# Patient Record
Sex: FEMALE | Race: WHITE | HISPANIC OR LATINO | URBAN - METROPOLITAN AREA
[De-identification: names, ages, dates, MRNs, and addresses within clinical notes are randomized per-mention and may not be internally consistent; named-entity substitution may affect disease eponyms.]

---

## 2019-06-05 ENCOUNTER — OFFICE VISIT (OUTPATIENT)
Dept: MEDICAL GROUP | Facility: PHYSICIAN GROUP | Age: 29
End: 2019-06-05
Payer: COMMERCIAL

## 2019-06-05 VITALS
SYSTOLIC BLOOD PRESSURE: 120 MMHG | WEIGHT: 161.2 LBS | BODY MASS INDEX: 25.91 KG/M2 | TEMPERATURE: 97.9 F | HEART RATE: 91 BPM | RESPIRATION RATE: 16 BRPM | HEIGHT: 66 IN | OXYGEN SATURATION: 97 % | DIASTOLIC BLOOD PRESSURE: 70 MMHG

## 2019-06-05 DIAGNOSIS — Z30.41 ENCOUNTER FOR SURVEILLANCE OF CONTRACEPTIVE PILLS: ICD-10-CM

## 2019-06-05 DIAGNOSIS — Z83.3 FAMILY HISTORY OF DIABETES MELLITUS: ICD-10-CM

## 2019-06-05 DIAGNOSIS — Z00.00 WELLNESS EXAMINATION: Primary | ICD-10-CM

## 2019-06-05 DIAGNOSIS — J30.89 ENVIRONMENTAL AND SEASONAL ALLERGIES: ICD-10-CM

## 2019-06-05 PROCEDURE — 99385 PREV VISIT NEW AGE 18-39: CPT | Performed by: PHYSICIAN ASSISTANT

## 2019-06-05 RX ORDER — NORETHINDRONE ACETATE AND ETHINYL ESTRADIOL AND FERROUS FUMARATE 1MG-20(21)
1 KIT ORAL DAILY
COMMUNITY
Start: 2019-03-25 | End: 2019-09-20 | Stop reason: SDUPTHER

## 2019-06-05 RX ORDER — NORETHINDRONE ACETATE AND ETHINYL ESTRADIOL .03; 1.5 MG/1; MG/1
TABLET ORAL
COMMUNITY
End: 2019-06-05 | Stop reason: CLARIF

## 2019-06-05 ASSESSMENT — PATIENT HEALTH QUESTIONNAIRE - PHQ9
5. POOR APPETITE OR OVEREATING: 0 - NOT AT ALL
SUM OF ALL RESPONSES TO PHQ QUESTIONS 1-9: 1
CLINICAL INTERPRETATION OF PHQ2 SCORE: 1

## 2019-06-14 ENCOUNTER — HOSPITAL ENCOUNTER (OUTPATIENT)
Dept: LAB | Facility: MEDICAL CENTER | Age: 29
End: 2019-06-14
Attending: PHYSICIAN ASSISTANT
Payer: COMMERCIAL

## 2019-06-14 DIAGNOSIS — Z83.3 FAMILY HISTORY OF DIABETES MELLITUS: ICD-10-CM

## 2019-06-14 DIAGNOSIS — Z00.00 WELLNESS EXAMINATION: ICD-10-CM

## 2019-06-14 LAB
ALBUMIN SERPL BCP-MCNC: 4 G/DL (ref 3.2–4.9)
ALBUMIN/GLOB SERPL: 1.3 G/DL
ALP SERPL-CCNC: 40 U/L (ref 30–99)
ALT SERPL-CCNC: 13 U/L (ref 2–50)
ANION GAP SERPL CALC-SCNC: 11 MMOL/L (ref 0–11.9)
AST SERPL-CCNC: 18 U/L (ref 12–45)
BILIRUB SERPL-MCNC: 0.4 MG/DL (ref 0.1–1.5)
BUN SERPL-MCNC: 9 MG/DL (ref 8–22)
CALCIUM SERPL-MCNC: 8.9 MG/DL (ref 8.5–10.5)
CHLORIDE SERPL-SCNC: 106 MMOL/L (ref 96–112)
CHOLEST SERPL-MCNC: 153 MG/DL (ref 100–199)
CO2 SERPL-SCNC: 22 MMOL/L (ref 20–33)
CREAT SERPL-MCNC: 0.82 MG/DL (ref 0.5–1.4)
FASTING STATUS PATIENT QL REPORTED: NORMAL
GLOBULIN SER CALC-MCNC: 3.2 G/DL (ref 1.9–3.5)
GLUCOSE SERPL-MCNC: 94 MG/DL (ref 65–99)
HDLC SERPL-MCNC: 52 MG/DL
LDLC SERPL CALC-MCNC: 82 MG/DL
POTASSIUM SERPL-SCNC: 3.9 MMOL/L (ref 3.6–5.5)
PROT SERPL-MCNC: 7.2 G/DL (ref 6–8.2)
SODIUM SERPL-SCNC: 139 MMOL/L (ref 135–145)
TRIGL SERPL-MCNC: 96 MG/DL (ref 0–149)

## 2019-06-14 PROCEDURE — 80053 COMPREHEN METABOLIC PANEL: CPT

## 2019-06-14 PROCEDURE — 80061 LIPID PANEL: CPT

## 2019-06-14 PROCEDURE — 36415 COLL VENOUS BLD VENIPUNCTURE: CPT

## 2019-06-21 NOTE — PROGRESS NOTES
Chief Complaint   Patient presents with   • Establish Care     est nuria holliday-  no prior provider        HISTORY OF THE PRESENT ILLNESS: Kirstin Kam is a 29 y.o. female new patient to our practice. This pleasant patient is here today to establish care and to discuss the evaluation and management of:    Patient is a pleasant 29-year-old female here today for annual wellness examination.  She tells me she is in overall good health and has no complaints.  States she has a positive medical history for environmental and seasonal allergy and takes Claritin.  States symptoms are managed with medication.  She also tells me that her oral contraceptive Junel for contraceptive reasons.  States she takes birth control continuously.  States she is compliant with medication.  She tells me that she has been on oral contraceptive since 19 years old.  States her last menstrual period was in zero 3/18 and was 4-5 days in duration.  She tells me menses was heavier during that time.  Patient is  and states they are in a relationship. Denies being pregnant.  States she has a regular exercise routine exercise routine consist of yoga, walking, and running.  States on average she exercises 2-4 times a week anywhere from 30-60 minutes per time.  She mentions that her diet is healthy and she is cautious of sugar consumption.    States last Pap was in [id number] at [place].  Results were normal.  Denies having abnormal Pap in the past.    She tells me she had chickenpox as a child.  States her Tdap is up-to-date.    She mentions that she is a family history of diabetes.  Lab work will be ordered for further evaluation.  Patient is a symptomatic.    She denies doing self breast exams.  States she takes over-the-counter multivitamin.  Admits to practicing seatbelt safety.  States she could be better at applying sunscreen and wear protective clothing when outdoors.  She tells me that she practices good sleep hygiene on average sleeps  8-7 hours per night.  Admits to falling asleep easily and staying asleep.  Admits to getting regular eye and dental exams.      History reviewed. No pertinent past medical history.    Past Surgical History:   Procedure Laterality Date   • DENTAL EXTRACTION(S)         Family Status   Relation Status   • Mo Alive   • Fa Alive   • Sis Alive   • MGMo    • MGFa    • PGMo    • PGFa    • Sis Alive     Family History   Problem Relation Age of Onset   • Diabetes Mother         Type II    • Hypertension Mother    • Diabetes Father         Type II    • No Known Problems Sister    • Cancer Paternal Grandfather         Stomach CA   • No Known Problems Sister        Social History   Substance Use Topics   • Smoking status: Never Smoker   • Smokeless tobacco: Never Used   • Alcohol use 1.8 oz/week     3 Glasses of wine per week      Comment: 2-4 per week        Allergies: Patient has no known allergies.    Current Outpatient Prescriptions Ordered in [facility]   Medication Sig Dispense Refill   • JUNEL FE 1/20 1-20 MG-MCG per tablet Take 1 Tab by mouth every day.       No current [facility]-ordered facility-administered medications on file.        Review of Systems   Constitutional: Negative for fever, chills, weight loss and malaise/fatigue.   HENT: Negative for ear pain, nosebleeds, congestion, sore throat and neck pain.    Eyes: Negative for blurred vision.   Respiratory: Negative for cough, sputum production, shortness of breath and wheezing.    Cardiovascular: Negative for chest pain, palpitations, orthopnea and leg swelling.   Gastrointestinal: Negative for heartburn, nausea, vomiting and abdominal pain.   Genitourinary: Negative for dysuria, urgency and frequency.   Musculoskeletal: Negative for myalgias, back pain and joint pain.   Skin: Negative for rash and itching.   Neurological: Negative for dizziness, tingling, tremors, sensory change, focal weakness and headaches.   Endo/Heme/Allergies: Does not  "bruise/bleed easily.   Psychiatric/Behavioral: Negative for depression, anxiety, or memory loss.     All other systems reviewed and are negative except as in HPI.    Exam: /70 (BP Location: Left arm, Patient Position: Sitting, BP Cuff Size: Adult)   Pulse 91   Temp 36.6 °C (97.9 °F) (Temporal)   Resp 16   Ht 1.664 m (5' 5.5\")   Wt 73.1 kg (161 lb 3.2 oz)   SpO2 97%  Body mass index is 26.42 kg/m².  General: Normal appearing. No distress.  HEENT: Normocephalic. Eyes conjunctiva clear lids without ptosis, pupils equal and reactive to light accommodation, ears normal shape and contour, canals are clear bilaterally, tympanic membranes are benign, nasal mucosa benign, oropharynx is without erythema, edema or exudates.   Neck: Supple without JVD or bruit. Thyroid is not enlarged.  Pulmonary: Clear to ausculation.  Normal effort. No rales, ronchi, or wheezing.  Cardiovascular: Regular rate and rhythm without murmur.   Abdomen: Soft, nontender, nondistended. Normal bowel sounds. Liver and spleen are not palpable  Neurologic: Grossly nonfocal  Lymph: No cervical, supraclavicular or axillary lymph nodes are palpable  Skin: Warm and dry.  No obvious lesions.  Musculoskeletal: Normal gait. No extremity cyanosis, clubbing, or edema.  Psych: Normal mood and affect. Alert and oriented x3. Judgment and insight is normal.      Medical decision-making and discussion:  1. Wellness examination  - Encouraged diet high in fruits, vegetables, and fiber. And a diet low in salt, refined carbohydrates, cholesterol, saturated fat, and trans fatty acids.    - Encouraged  a minimum of 30 minutes of moderate intensity aerobic exercise (eg, brisk walking) is recommended on five days each week. Or 20 minutes of vigorous-intensity aerobic exercise (eg, jogging) on three days each week.     Continue getting regular eye dental exams.  Continue working on diet, exercise, sleep hygiene.  Continue work on hydration.  Discussed importance of " being compliant with birth control.  Continue multivitamin.  Advised patient to do self breast exams at least once monthly.  Continue practicing seatbelt safety.  Advised patient to wear sunscreen and protective clothing when outdoors.    - Lipid Profile; Future  - Comp Metabolic Panel; Future    2. Environmental and seasonal allergies  Advised nasal saline and steroid sprays daily. OTC anti-histamines (Zyrtec) as needed. Encouraged allergen avoidence and environment modification when possible. If regular use not effective, may consider referral to allergist for immunotherapy.      3. Encounter for surveillance of contraceptive pills  Discussed OCPs at length. Instructed on importance of taking pill at the same time every day. Use back-up for up to 2 weeks. Discussed risks associated with OCPs including: blood clot, heart attack, and stroke. Advised not to smoke while on hormonal birth control.      4. Family history of diabetes mellitus    - Lipid Profile; Future  - Comp Metabolic Panel; Future      Please note that this dictation was created using voice recognition software. I have made every reasonable attempt to correct obvious errors, but I expect that there are errors of grammar and possibly content that I did not discover before finalizing the note.      Assessment/Plan  1. Wellness examination  Lipid Profile    Comp Metabolic Panel   2. Environmental and seasonal allergies     3. Encounter for surveillance of contraceptive pills     4. Family history of diabetes mellitus  Lipid Profile    Comp Metabolic Panel       Return in about 1 year (around 6/5/2020), or if symptoms worsen or fail to improve.

## 2019-09-20 RX ORDER — NORETHINDRONE ACETATE AND ETHINYL ESTRADIOL AND FERROUS FUMARATE 1MG-20(21)
1 KIT ORAL DAILY
Qty: 84 TAB | Refills: 2 | Status: SHIPPED | OUTPATIENT
Start: 2019-09-20 | End: 2019-10-09 | Stop reason: SDUPTHER

## 2019-10-09 ENCOUNTER — TELEPHONE (OUTPATIENT)
Dept: MEDICAL GROUP | Facility: PHYSICIAN GROUP | Age: 29
End: 2019-10-09

## 2019-10-09 RX ORDER — NORETHINDRONE ACETATE AND ETHINYL ESTRADIOL AND FERROUS FUMARATE 1MG-20(21)
1 KIT ORAL DAILY
Qty: 84 TAB | Refills: 0 | Status: CANCELLED | OUTPATIENT
Start: 2019-10-09

## 2019-10-09 RX ORDER — NORETHINDRONE ACETATE AND ETHINYL ESTRADIOL AND FERROUS FUMARATE 1MG-20(21)
1 KIT ORAL DAILY
Qty: 84 TAB | Refills: 2 | Status: SHIPPED | OUTPATIENT
Start: 2019-10-09 | End: 2020-05-11

## 2019-10-09 NOTE — TELEPHONE ENCOUNTER
Phone Number Called: [phone] (home)       Call outcome: spoke to patient regarding message below    Message: Pt told me that she needs a refill on her birth control (Junel ). Pt asked if order can be faxed to [facility], Fax:[phone].

## 2019-10-09 NOTE — TELEPHONE ENCOUNTER
VOICEMAIL  1. Caller Name: Kirstin Kam    Call Back Number: [phone] (home)       2. Message: Pt lvm that she has a rx request that was sent that needs approval.     3. Patient approves office to leave a detailed voicemail/MyChart message: N\A

## 2020-05-12 RX ORDER — NORETHINDRONE ACETATE AND ETHINYL ESTRADIOL AND FERROUS FUMARATE 1MG-20(21)
KIT ORAL
Qty: 84 TAB | Refills: 0 | Status: SHIPPED | OUTPATIENT
Start: 2020-05-12 | End: 2020-07-27

## 2020-07-27 RX ORDER — NORETHINDRONE ACETATE AND ETHINYL ESTRADIOL AND FERROUS FUMARATE 1MG-20(21)
KIT ORAL
Qty: 84 TAB | Refills: 0 | Status: SHIPPED | OUTPATIENT
Start: 2020-07-27 | End: 2020-11-02

## 2020-09-28 ENCOUNTER — OFFICE VISIT (OUTPATIENT)
Dept: URGENT CARE | Facility: CLINIC | Age: 30
End: 2020-09-28
Payer: COMMERCIAL

## 2020-09-28 ENCOUNTER — NURSE TRIAGE (OUTPATIENT)
Dept: HEALTH INFORMATION MANAGEMENT | Facility: OTHER | Age: 30
End: 2020-09-28

## 2020-09-28 VITALS
WEIGHT: 166.4 LBS | DIASTOLIC BLOOD PRESSURE: 68 MMHG | SYSTOLIC BLOOD PRESSURE: 120 MMHG | HEART RATE: 92 BPM | BODY MASS INDEX: 28.41 KG/M2 | RESPIRATION RATE: 14 BRPM | TEMPERATURE: 98.6 F | OXYGEN SATURATION: 98 % | HEIGHT: 64 IN

## 2020-09-28 DIAGNOSIS — J02.8 ACUTE PHARYNGITIS DUE TO OTHER SPECIFIED ORGANISMS: ICD-10-CM

## 2020-09-28 DIAGNOSIS — J02.9 SORE THROAT: ICD-10-CM

## 2020-09-28 LAB
INT CON NEG: NORMAL
INT CON POS: NORMAL
S PYO AG THROAT QL: NORMAL

## 2020-09-28 PROCEDURE — 99213 OFFICE O/P EST LOW 20 MIN: CPT | Performed by: NURSE PRACTITIONER

## 2020-09-28 PROCEDURE — 87880 STREP A ASSAY W/OPTIC: CPT | Performed by: NURSE PRACTITIONER

## 2020-09-28 ASSESSMENT — ENCOUNTER SYMPTOMS
EYE DISCHARGE: 0
NAUSEA: 0
FEVER: 0
HEADACHES: 0
COUGH: 0
MYALGIAS: 0
CHILLS: 0
SORE THROAT: 1
DIARRHEA: 0

## 2020-09-28 NOTE — TELEPHONE ENCOUNTER
"  1. Caller Name: Kirstin Kam                   Call Back Number: [phone] (home)     [facility] PCP or Specialty Provider: Yes         2.  In the last two weeks, has the patient had any new or worsening symptoms (not explained by alternative diagnosis)? Yes, the patient reports the following COVID-19 consistent symptoms: sore throat and congestion or runny nose.    3.  Does patient have any comoribidities? None     4.  Has the patient traveled in the last 14 days OR had any known contact with someone who is suspected or confirmed to have COVID-19?  No.    5. Disposition: Advised to go to     Note routed to [facility] Provider: LINDA only.   Reason for Disposition  • SEVERE sore throat pain    Additional Information  • Negative: SEVERE difficulty breathing (e.g., struggling for each breath, speaks in single words)  • Negative: Sounds like a life-threatening emergency to the triager  • Negative: Throat culture results, call about  • Negative: Productive cough is the main symptom  • Negative: Runny nose is the main symptom  • Negative: Drooling or spitting out saliva (because can't swallow)  • Negative: Unable to open mouth completely  • Negative: Drinking very little and has signs of dehydration (e.g., no urine > 12 hours, very dry mouth, very lightheaded)  • Negative: Patient sounds very sick or weak to the triager  • Negative: Difficulty breathing (per caller) but not severe  • Negative: Fever > 103 F (39.4 C)  • Negative: Refuses to drink anything for > 12 hours    Answer Assessment - Initial Assessment Questions  1. ONSET: \"When did the throat start hurting?\" (Hours or days ago)       today  2. SEVERITY: \"How bad is the sore throat?\" (Scale 1-10; mild, moderate or severe)    - MILD (1-3):  doesn't interfere with eating or normal activities    - MODERATE (4-7): interferes with eating some solids and normal activities    - SEVERE (8-10):  excruciating pain, interferes with most normal activities    - SEVERE " "DYSPHAGIA: can't swallow liquids, drooling      8/10  3. STREP EXPOSURE: \"Has there been any exposure to strep within the past week?\" If so, ask: \"What type of contact occurred?\"       no  4.  VIRAL SYMPTOMS: \"Are there any symptoms of a cold, such as a runny nose, cough, hoarse voice or red eyes?\"       Runny nose  5. FEVER: \"Do you have a fever?\" If so, ask: \"What is your temperature, how was it measured, and when did it start?\"      none  6. PUS ON THE TONSILS: \"Is there pus on the tonsils in the back of your throat?\"      non3  7. OTHER SYMPTOMS: \"Do you have any other symptoms?\" (e.g., difficulty breathing, headache, rash)      none  8. PREGNANCY: \"Is there any chance you are pregnant?\" \"When was your last menstrual period?\"      No LMP 8/30    Protocols used: SORE THROAT-A-OH      "

## 2020-09-28 NOTE — PROGRESS NOTES
Subjective:      Kirstin Kam is a 30 y.o. female who presents with Pharyngitis (x1 days, sore throat, swollen tonsils )            HPI New. 30 year old female with sore throat that started this morning. She denies fever, chills, myalgia, ear pain or neck pain. She denies congestion or cough. No known exposure to Covid 19. She has not taken any medication for this issue.  Patient has no known allergies.  Current Outpatient Medications on File Prior to Visit   Medication Sig Dispense Refill   • JUNEL FE 1/20 1-20 MG-MCG per tablet TAKE 1 TABLET BY MOUTH  DAILY (Patient not taking: Reported on 9/28/2020) 84 Tab 0     No current facility-administered medications on file prior to visit.      Social History     Socioeconomic History   • Marital status: Single     Spouse name: Not on file   • Number of children: Not on file   • Years of education: Not on file   • Highest education level: Not on file   Occupational History   • Not on file   Social Needs   • Financial resource strain: Not on file   • Food insecurity     Worry: Not on file     Inability: Not on file   • Transportation needs     Medical: Not on file     Non-medical: Not on file   Tobacco Use   • Smoking status: Never Smoker   • Smokeless tobacco: Never Used   Substance and Sexual Activity   • Alcohol use: Yes     Alcohol/week: 1.8 oz     Types: 3 Glasses of wine per week     Comment: 2-4 per week    • Drug use: Yes     Types: Marijuana     Comment: 3-4 times per week.    • Sexual activity: Yes     Partners: Male     Birth control/protection: Pill     Comment: Committed relationship.    Lifestyle   • Physical activity     Days per week: Not on file     Minutes per session: Not on file   • Stress: Not on file   Relationships   • Social connections     Talks on phone: Not on file     Gets together: Not on file     Attends Yazidism service: Not on file     Active member of club or organization: Not on file     Attends meetings of clubs or organizations: Not on  "file     Relationship status: Not on file   • Intimate partner violence     Fear of current or ex partner: Not on file     Emotionally abused: Not on file     Physically abused: Not on file     Forced sexual activity: Not on file   Other Topics Concern   • Not on file   Social History Narrative   • Not on file     Breast Cancer-related family history is not on file.      Review of Systems   Constitutional: Negative for chills, fever and malaise/fatigue.   HENT: Positive for sore throat. Negative for congestion.    Eyes: Negative for discharge.   Respiratory: Negative for cough.    Gastrointestinal: Negative for diarrhea and nausea.   Musculoskeletal: Negative for myalgias.   Neurological: Negative for headaches.   Endo/Heme/Allergies: Negative for environmental allergies.          Objective:     /68   Pulse 92   Temp 37 °C (98.6 °F) (Temporal)   Resp 14   Ht 1.626 m (5' 4\")   Wt 75.5 kg (166 lb 6.4 oz)   SpO2 98%   BMI 28.56 kg/m²      Physical Exam  Vitals signs and nursing note reviewed.   Constitutional:       General: She is not in acute distress.     Appearance: Normal appearance. She is well-developed. She is not ill-appearing.   HENT:      Head: Normocephalic and atraumatic.      Right Ear: Tympanic membrane, ear canal and external ear normal. No middle ear effusion. Tympanic membrane is not injected or perforated.      Left Ear: Tympanic membrane, ear canal and external ear normal.  No middle ear effusion. Tympanic membrane is not injected or perforated.      Nose: Mucosal edema present. No congestion or rhinorrhea.      Mouth/Throat:      Mouth: Mucous membranes are moist.      Pharynx: Posterior oropharyngeal erythema present. No oropharyngeal exudate.   Eyes:      General:         Right eye: No discharge.         Left eye: No discharge.      Conjunctiva/sclera: Conjunctivae normal.   Neck:      Musculoskeletal: Normal range of motion and neck supple.   Cardiovascular:      Rate and Rhythm: " Normal rate and regular rhythm.      Heart sounds: Normal heart sounds. No murmur.   Pulmonary:      Effort: Pulmonary effort is normal. No respiratory distress.      Breath sounds: Normal breath sounds.   Musculoskeletal: Normal range of motion.      Comments: Normal movement of all 4 extremities.   Lymphadenopathy:      Cervical: No cervical adenopathy.      Upper Body:      Right upper body: No supraclavicular adenopathy.      Left upper body: No supraclavicular adenopathy.   Skin:     General: Skin is warm and dry.   Neurological:      Mental Status: She is alert and oriented to person, place, and time.      Gait: Gait normal.   Psychiatric:         Behavior: Behavior normal.         Thought Content: Thought content normal.                 Assessment/Plan:        1. Acute pharyngitis due to other specified organisms     2. Sore throat  POCT Rapid Strep A     Strep negative.  Viral illness at this time with no indication for antibiotics. Reviewed with patient expected course of illness and also reviewed OTC medications that may be used for symptom relief. Follow up 7-10 days if not improving.

## 2020-11-16 RX ORDER — NORETHINDRONE ACETATE AND ETHINYL ESTRADIOL AND FERROUS FUMARATE 1MG-20(21)
KIT ORAL
Qty: 28 TAB | Refills: 0 | Status: SHIPPED | OUTPATIENT
Start: 2020-11-16

## 2024-07-02 ENCOUNTER — APPOINTMENT (RX ONLY)
Dept: URBAN - METROPOLITAN AREA CLINIC 6 | Facility: CLINIC | Age: 34
Setting detail: DERMATOLOGY
End: 2024-07-02

## 2024-07-02 DIAGNOSIS — D22 MELANOCYTIC NEVI: ICD-10-CM

## 2024-07-02 DIAGNOSIS — L20.89 OTHER ATOPIC DERMATITIS: ICD-10-CM | Status: RESOLVED

## 2024-07-02 DIAGNOSIS — L70.0 ACNE VULGARIS: ICD-10-CM | Status: STABLE

## 2024-07-02 DIAGNOSIS — L81.4 OTHER MELANIN HYPERPIGMENTATION: ICD-10-CM

## 2024-07-02 DIAGNOSIS — Z71.89 OTHER SPECIFIED COUNSELING: ICD-10-CM

## 2024-07-02 DIAGNOSIS — D18.0 HEMANGIOMA: ICD-10-CM

## 2024-07-02 PROBLEM — D18.01 HEMANGIOMA OF SKIN AND SUBCUTANEOUS TISSUE: Status: ACTIVE | Noted: 2024-07-02

## 2024-07-02 PROBLEM — D22.5 MELANOCYTIC NEVI OF TRUNK: Status: ACTIVE | Noted: 2024-07-02

## 2024-07-02 PROCEDURE — ? COUNSELING

## 2024-07-02 PROCEDURE — ? DEFER

## 2024-07-02 PROCEDURE — 99203 OFFICE O/P NEW LOW 30 MIN: CPT

## 2024-07-02 PROCEDURE — ? SUNSCREEN TREATMENT REGIMEN

## 2024-07-02 PROCEDURE — ? ADDITIONAL NOTES

## 2024-07-02 ASSESSMENT — LOCATION SIMPLE DESCRIPTION DERM
LOCATION SIMPLE: LEFT POPLITEAL SKIN
LOCATION SIMPLE: CHEST
LOCATION SIMPLE: LEFT LIP
LOCATION SIMPLE: LEFT ELBOW
LOCATION SIMPLE: RIGHT HAND
LOCATION SIMPLE: RIGHT ELBOW
LOCATION SIMPLE: ABDOMEN
LOCATION SIMPLE: LEFT HAND
LOCATION SIMPLE: RIGHT POPLITEAL SKIN
LOCATION SIMPLE: LEFT FOREHEAD

## 2024-07-02 ASSESSMENT — LOCATION ZONE DERM
LOCATION ZONE: HAND
LOCATION ZONE: FACE
LOCATION ZONE: ARM
LOCATION ZONE: LEG
LOCATION ZONE: LIP
LOCATION ZONE: TRUNK

## 2024-07-02 ASSESSMENT — LOCATION DETAILED DESCRIPTION DERM
LOCATION DETAILED: LEFT POPLITEAL SKIN
LOCATION DETAILED: EPIGASTRIC SKIN
LOCATION DETAILED: PERIUMBILICAL SKIN
LOCATION DETAILED: LEFT RADIAL DORSAL HAND
LOCATION DETAILED: RIGHT RADIAL DORSAL HAND
LOCATION DETAILED: RIGHT ELBOW
LOCATION DETAILED: LEFT ELBOW
LOCATION DETAILED: LEFT LOWER CUTANEOUS LIP
LOCATION DETAILED: LEFT INFERIOR FOREHEAD
LOCATION DETAILED: RIGHT POPLITEAL SKIN
LOCATION DETAILED: MIDDLE STERNUM

## 2024-07-02 NOTE — PROCEDURE: ADDITIONAL NOTES
Additional Notes: Can prescribe Triamcinolone if flaring in the future\\nRecommend OTC Hydrocortisone Cream
Detail Level: Simple
Render Risk Assessment In Note?: no

## 2024-07-02 NOTE — PROCEDURE: DEFER
X Size Of Lesion In Cm (Optional): 0
Detail Level: Detailed
Introduction Text (Please End With A Colon): Declines treatment at this time

## 2024-07-02 NOTE — PROCEDURE: COUNSELING
Detail Level: Generalized
Detail Level: Zone
Detail Level: Simple
Use Enhanced Medication Counseling?: No
Aklief Pregnancy And Lactation Text: It is unknown if this medication is safe to use during pregnancy.  It is unknown if this medication is excreted in breast milk.  Breastfeeding women should use the topical cream on the smallest area of the skin for the shortest time needed while breastfeeding.  Do not apply to nipple and areola.
Sarecycline Counseling: Patient advised regarding possible photosensitivity and discoloration of the teeth, skin, lips, tongue and gums.  Patient instructed to avoid sunlight, if possible.  When exposed to sunlight, patients should wear protective clothing, sunglasses, and sunscreen.  The patient was instructed to call the office immediately if the following severe adverse effects occur:  hearing changes, easy bruising/bleeding, severe headache, or vision changes.  The patient verbalized understanding of the proper use and possible adverse effects of sarecycline.  All of the patient's questions and concerns were addressed.
Topical Clindamycin Counseling: Patient counseled that this medication may cause skin irritation or allergic reactions.  In the event of skin irritation, the patient was advised to reduce the amount of the drug applied or use it less frequently.   The patient verbalized understanding of the proper use and possible adverse effects of clindamycin.  All of the patient's questions and concerns were addressed.
Spironolactone Counseling: Patient advised regarding risks of diarrhea, abdominal pain, hyperkalemia, birth defects (for female patients), liver toxicity and renal toxicity. The patient may need blood work to monitor liver and kidney function and potassium levels while on therapy. The patient verbalized understanding of the proper use and possible adverse effects of spironolactone.  All of the patient's questions and concerns were addressed.
Azelaic Acid Pregnancy And Lactation Text: This medication is considered safe during pregnancy and breast feeding.
Dapsone Counseling: I discussed with the patient the risks of dapsone including but not limited to hemolytic anemia, agranulocytosis, rashes, methemoglobinemia, kidney failure, peripheral neuropathy, headaches, GI upset, and liver toxicity.  Patients who start dapsone require monitoring including baseline LFTs and weekly CBCs for the first month, then every month thereafter.  The patient verbalized understanding of the proper use and possible adverse effects of dapsone.  All of the patient's questions and concerns were addressed.
Isotretinoin Pregnancy And Lactation Text: This medication is Pregnancy Category X and is considered extremely dangerous during pregnancy. It is unknown if it is excreted in breast milk.
Tazorac Counseling:  Patient advised that medication is irritating and drying.  Patient may need to apply sparingly and wash off after an hour before eventually leaving it on overnight.  The patient verbalized understanding of the proper use and possible adverse effects of tazorac.  All of the patient's questions and concerns were addressed.
Winlevi Pregnancy And Lactation Text: This medication is considered safe during pregnancy and breastfeeding.
Topical Retinoid counseling:  Patient advised to apply a pea-sized amount only at bedtime and wait 30 minutes after washing their face before applying.  If too drying, patient may add a non-comedogenic moisturizer. The patient verbalized understanding of the proper use and possible adverse effects of retinoids.  All of the patient's questions and concerns were addressed.
Topical Sulfur Applications Pregnancy And Lactation Text: This medication is Pregnancy Category C and has an unknown safety profile during pregnancy. It is unknown if this topical medication is excreted in breast milk.
Birth Control Pills Counseling: Birth Control Pill Counseling: I discussed with the patient the potential side effects of OCPs including but not limited to increased risk of stroke, heart attack, thrombophlebitis, deep venous thrombosis, hepatic adenomas, breast changes, GI upset, headaches, and depression.  The patient verbalized understanding of the proper use and possible adverse effects of OCPs. All of the patient's questions and concerns were addressed.
Erythromycin Pregnancy And Lactation Text: This medication is Pregnancy Category B and is considered safe during pregnancy. It is also excreted in breast milk.
Doxycycline Pregnancy And Lactation Text: This medication is Pregnancy Category D and not consider safe during pregnancy. It is also excreted in breast milk but is considered safe for shorter treatment courses.
Minocycline Counseling: Patient advised regarding possible photosensitivity and discoloration of the teeth, skin, lips, tongue and gums.  Patient instructed to avoid sunlight, if possible.  When exposed to sunlight, patients should wear protective clothing, sunglasses, and sunscreen.  The patient was instructed to call the office immediately if the following severe adverse effects occur:  hearing changes, easy bruising/bleeding, severe headache, or vision changes.  The patient verbalized understanding of the proper use and possible adverse effects of minocycline.  All of the patient's questions and concerns were addressed.
Bactrim Counseling:  I discussed with the patient the risks of sulfa antibiotics including but not limited to GI upset, allergic reaction, drug rash, diarrhea, dizziness, photosensitivity, and yeast infections.  Rarely, more serious reactions can occur including but not limited to aplastic anemia, agranulocytosis, methemoglobinemia, blood dyscrasias, liver or kidney failure, lung infiltrates or desquamative/blistering drug rashes.
Tetracycline Pregnancy And Lactation Text: This medication is Pregnancy Category D and not consider safe during pregnancy. It is also excreted in breast milk.
Azelaic Acid Counseling: Patient counseled that medicine may cause skin irritation and to avoid applying near the eyes.  In the event of skin irritation, the patient was advised to reduce the amount of the drug applied or use it less frequently.   The patient verbalized understanding of the proper use and possible adverse effects of azelaic acid.  All of the patient's questions and concerns were addressed.
Birth Control Pills Pregnancy And Lactation Text: This medication should be avoided if pregnant and for the first 30 days post-partum.
Isotretinoin Counseling: Patient should get monthly blood tests, not donate blood, not drive at night if vision affected, not share medication, and not undergo elective surgery for 6 months after tx completed. Side effects reviewed, pt to contact office should one occur.
Benzoyl Peroxide Counseling: Patient counseled that medicine may cause skin irritation and bleach clothing.  In the event of skin irritation, the patient was advised to reduce the amount of the drug applied or use it less frequently.   The patient verbalized understanding of the proper use and possible adverse effects of benzoyl peroxide.  All of the patient's questions and concerns were addressed.
Topical Clindamycin Pregnancy And Lactation Text: This medication is Pregnancy Category B and is considered safe during pregnancy. It is unknown if it is excreted in breast milk.
Winlevi Counseling:  I discussed with the patient the risks of topical clascoterone including but not limited to erythema, scaling, itching, and stinging. Patient voiced their understanding.
Azithromycin Counseling:  I discussed with the patient the risks of azithromycin including but not limited to GI upset, allergic reaction, drug rash, diarrhea, and yeast infections.
Spironolactone Pregnancy And Lactation Text: This medication can cause feminization of the male fetus and should be avoided during pregnancy. The active metabolite is also found in breast milk.
Dapsone Pregnancy And Lactation Text: This medication is Pregnancy Category C and is not considered safe during pregnancy or breast feeding.
High Dose Vitamin A Counseling: Side effects reviewed, pt to contact office should one occur.
Tazorac Pregnancy And Lactation Text: This medication is not safe during pregnancy. It is unknown if this medication is excreted in breast milk.
Doxycycline Counseling:  Patient counseled regarding possible photosensitivity and increased risk for sunburn.  Patient instructed to avoid sunlight, if possible.  When exposed to sunlight, patients should wear protective clothing, sunglasses, and sunscreen.  The patient was instructed to call the office immediately if the following severe adverse effects occur:  hearing changes, easy bruising/bleeding, severe headache, or vision changes.  The patient verbalized understanding of the proper use and possible adverse effects of doxycycline.  All of the patient's questions and concerns were addressed.
High Dose Vitamin A Pregnancy And Lactation Text: High dose vitamin A therapy is contraindicated during pregnancy and breast feeding.
Tetracycline Counseling: Patient counseled regarding possible photosensitivity and increased risk for sunburn.  Patient instructed to avoid sunlight, if possible.  When exposed to sunlight, patients should wear protective clothing, sunglasses, and sunscreen.  The patient was instructed to call the office immediately if the following severe adverse effects occur:  hearing changes, easy bruising/bleeding, severe headache, or vision changes.  The patient verbalized understanding of the proper use and possible adverse effects of tetracycline.  All of the patient's questions and concerns were addressed. Patient understands to avoid pregnancy while on therapy due to potential birth defects.
Azithromycin Pregnancy And Lactation Text: This medication is considered safe during pregnancy and is also secreted in breast milk.
Topical Retinoid Pregnancy And Lactation Text: This medication is Pregnancy Category C. It is unknown if this medication is excreted in breast milk.
Erythromycin Counseling:  I discussed with the patient the risks of erythromycin including but not limited to GI upset, allergic reaction, drug rash, diarrhea, increase in liver enzymes, and yeast infections.
Aklief counseling:  Patient advised to apply a pea-sized amount only at bedtime and wait 30 minutes after washing their face before applying.  If too drying, patient may add a non-comedogenic moisturizer.  The most commonly reported side effects including irritation, redness, scaling, dryness, stinging, burning, itching, and increased risk of sunburn.  The patient verbalized understanding of the proper use and possible adverse effects of retinoids.  All of the patient's questions and concerns were addressed.
Bactrim Pregnancy And Lactation Text: This medication is Pregnancy Category D and is known to cause fetal risk.  It is also excreted in breast milk.
Benzoyl Peroxide Pregnancy And Lactation Text: This medication is Pregnancy Category C. It is unknown if benzoyl peroxide is excreted in breast milk.
Topical Sulfur Applications Counseling: Topical Sulfur Counseling: Patient counseled that this medication may cause skin irritation or allergic reactions.  In the event of skin irritation, the patient was advised to reduce the amount of the drug applied or use it less frequently.   The patient verbalized understanding of the proper use and possible adverse effects of topical sulfur application.  All of the patient's questions and concerns were addressed.

## 2024-09-23 ENCOUNTER — HOSPITAL ENCOUNTER (OUTPATIENT)
Dept: LAB | Facility: MEDICAL CENTER | Age: 34
End: 2024-09-23
Attending: OBSTETRICS & GYNECOLOGY
Payer: COMMERCIAL

## 2024-09-23 LAB
BASOPHILS # BLD AUTO: 0.6 % (ref 0–1.8)
BASOPHILS # BLD: 0.04 K/UL (ref 0–0.12)
EOSINOPHIL # BLD AUTO: 0.22 K/UL (ref 0–0.51)
EOSINOPHIL NFR BLD: 3.5 % (ref 0–6.9)
ERYTHROCYTE [DISTWIDTH] IN BLOOD BY AUTOMATED COUNT: 46.5 FL (ref 35.9–50)
EST. AVERAGE GLUCOSE BLD GHB EST-MCNC: 111 MG/DL
HBA1C MFR BLD: 5.5 % (ref 4–5.6)
HCT VFR BLD AUTO: 38.7 % (ref 37–47)
HGB BLD-MCNC: 12.7 G/DL (ref 12–16)
IMM GRANULOCYTES # BLD AUTO: 0.01 K/UL (ref 0–0.11)
IMM GRANULOCYTES NFR BLD AUTO: 0.2 % (ref 0–0.9)
LYMPHOCYTES # BLD AUTO: 1.9 K/UL (ref 1–4.8)
LYMPHOCYTES NFR BLD: 30.2 % (ref 22–41)
MCH RBC QN AUTO: 28.5 PG (ref 27–33)
MCHC RBC AUTO-ENTMCNC: 32.8 G/DL (ref 32.2–35.5)
MCV RBC AUTO: 87 FL (ref 81.4–97.8)
MONOCYTES # BLD AUTO: 0.45 K/UL (ref 0–0.85)
MONOCYTES NFR BLD AUTO: 7.2 % (ref 0–13.4)
NEUTROPHILS # BLD AUTO: 3.67 K/UL (ref 1.82–7.42)
NEUTROPHILS NFR BLD: 58.3 % (ref 44–72)
NRBC # BLD AUTO: 0 K/UL
NRBC BLD-RTO: 0 /100 WBC (ref 0–0.2)
PLATELET # BLD AUTO: 232 K/UL (ref 164–446)
PMV BLD AUTO: 11.4 FL (ref 9–12.9)
RBC # BLD AUTO: 4.45 M/UL (ref 4.2–5.4)
WBC # BLD AUTO: 6.3 K/UL (ref 4.8–10.8)

## 2024-09-23 PROCEDURE — 84144 ASSAY OF PROGESTERONE: CPT

## 2024-09-23 PROCEDURE — 83550 IRON BINDING TEST: CPT

## 2024-09-23 PROCEDURE — 86141 C-REACTIVE PROTEIN HS: CPT

## 2024-09-23 PROCEDURE — 83090 ASSAY OF HOMOCYSTEINE: CPT

## 2024-09-23 PROCEDURE — 82642 DIHYDROTESTOSTERONE: CPT

## 2024-09-23 PROCEDURE — 81291 MTHFR GENE: CPT

## 2024-09-23 PROCEDURE — 83525 ASSAY OF INSULIN: CPT

## 2024-09-23 PROCEDURE — 83002 ASSAY OF GONADOTROPIN (LH): CPT

## 2024-09-23 PROCEDURE — 84480 ASSAY TRIIODOTHYRONINE (T3): CPT

## 2024-09-23 PROCEDURE — 84443 ASSAY THYROID STIM HORMONE: CPT

## 2024-09-23 PROCEDURE — 84439 ASSAY OF FREE THYROXINE: CPT

## 2024-09-23 PROCEDURE — 84270 ASSAY OF SEX HORMONE GLOBUL: CPT

## 2024-09-23 PROCEDURE — 83001 ASSAY OF GONADOTROPIN (FSH): CPT

## 2024-09-23 PROCEDURE — 86258 DGP ANTIBODY EACH IG CLASS: CPT

## 2024-09-23 PROCEDURE — 84482 T3 REVERSE: CPT

## 2024-09-23 PROCEDURE — 82607 VITAMIN B-12: CPT

## 2024-09-23 PROCEDURE — 86364 TISS TRNSGLTMNASE EA IG CLAS: CPT | Mod: 91

## 2024-09-23 PROCEDURE — 80061 LIPID PANEL: CPT

## 2024-09-23 PROCEDURE — 83735 ASSAY OF MAGNESIUM: CPT

## 2024-09-23 PROCEDURE — 80053 COMPREHEN METABOLIC PANEL: CPT

## 2024-09-23 PROCEDURE — 83036 HEMOGLOBIN GLYCOSYLATED A1C: CPT

## 2024-09-23 PROCEDURE — 86376 MICROSOMAL ANTIBODY EACH: CPT

## 2024-09-23 PROCEDURE — 85025 COMPLETE CBC W/AUTO DIFF WBC: CPT

## 2024-09-23 PROCEDURE — 82670 ASSAY OF TOTAL ESTRADIOL: CPT

## 2024-09-23 PROCEDURE — 82533 TOTAL CORTISOL: CPT

## 2024-09-23 PROCEDURE — 82746 ASSAY OF FOLIC ACID SERUM: CPT

## 2024-09-23 PROCEDURE — 36415 COLL VENOUS BLD VENIPUNCTURE: CPT

## 2024-09-23 PROCEDURE — 82728 ASSAY OF FERRITIN: CPT

## 2024-09-23 PROCEDURE — 84403 ASSAY OF TOTAL TESTOSTERONE: CPT

## 2024-09-23 PROCEDURE — 83540 ASSAY OF IRON: CPT

## 2024-09-23 PROCEDURE — 84481 FREE ASSAY (FT-3): CPT

## 2024-09-23 PROCEDURE — 82627 DEHYDROEPIANDROSTERONE: CPT

## 2024-09-23 PROCEDURE — 84402 ASSAY OF FREE TESTOSTERONE: CPT

## 2024-09-24 LAB
ALBUMIN SERPL BCP-MCNC: 4.1 G/DL (ref 3.2–4.9)
ALBUMIN/GLOB SERPL: 1.5 G/DL
ALP SERPL-CCNC: 54 U/L (ref 30–99)
ALT SERPL-CCNC: 14 U/L (ref 2–50)
ANION GAP SERPL CALC-SCNC: 13 MMOL/L (ref 7–16)
AST SERPL-CCNC: 21 U/L (ref 12–45)
BILIRUB SERPL-MCNC: 0.4 MG/DL (ref 0.1–1.5)
BUN SERPL-MCNC: 15 MG/DL (ref 8–22)
CALCIUM ALBUM COR SERPL-MCNC: 8.6 MG/DL (ref 8.5–10.5)
CALCIUM SERPL-MCNC: 8.7 MG/DL (ref 8.5–10.5)
CHLORIDE SERPL-SCNC: 106 MMOL/L (ref 96–112)
CHOLEST SERPL-MCNC: 162 MG/DL (ref 100–199)
CO2 SERPL-SCNC: 21 MMOL/L (ref 20–33)
CORTIS SERPL-MCNC: 8.8 UG/DL (ref 0–23)
CREAT SERPL-MCNC: 0.76 MG/DL (ref 0.5–1.4)
CRP SERPL HS-MCNC: 1.4 MG/L (ref 0–3)
DHEA-S SERPL-MCNC: 81.3 UG/DL (ref 98.8–340)
ESTRADIOL SERPL-MCNC: 90.2 PG/ML
FASTING STATUS PATIENT QL REPORTED: NORMAL
FERRITIN SERPL-MCNC: 8.6 NG/ML (ref 10–291)
FOLATE SERPL-MCNC: 6.4 NG/ML
FSH SERPL-ACNC: 3.6 MIU/ML
GFR SERPLBLD CREATININE-BSD FMLA CKD-EPI: 106 ML/MIN/1.73 M 2
GLIADIN IGA SER IA-ACNC: <0.72 FLU (ref 0–4.99)
GLOBULIN SER CALC-MCNC: 2.7 G/DL (ref 1.9–3.5)
GLUCOSE SERPL-MCNC: 87 MG/DL (ref 65–99)
HCYS SERPL-SCNC: 4.24 UMOL/L
HDLC SERPL-MCNC: 70 MG/DL
INSULIN P FAST SERPL-ACNC: 9 UIU/ML (ref 3–25)
IRON SATN MFR SERPL: 16 % (ref 15–55)
IRON SERPL-MCNC: 63 UG/DL (ref 40–170)
LDLC SERPL CALC-MCNC: 82 MG/DL
LH SERPL-ACNC: 6.9 IU/L
MAGNESIUM SERPL-MCNC: 1.7 MG/DL (ref 1.5–2.5)
POTASSIUM SERPL-SCNC: 3.8 MMOL/L (ref 3.6–5.5)
PROGEST SERPL-MCNC: 5.12 NG/ML
PROT SERPL-MCNC: 6.8 G/DL (ref 6–8.2)
SODIUM SERPL-SCNC: 140 MMOL/L (ref 135–145)
T3 SERPL-MCNC: 104 NG/DL (ref 60–181)
T3FREE SERPL-MCNC: 3.74 PG/ML (ref 2–4.4)
T4 FREE SERPL-MCNC: 1.07 NG/DL (ref 0.93–1.7)
T4 SERPL-MCNC: 4.5 UG/DL (ref 4–12)
THYROPEROXIDASE AB SERPL-ACNC: 47 IU/ML (ref 0–9)
TIBC SERPL-MCNC: 386 UG/DL (ref 250–450)
TRIGL SERPL-MCNC: 49 MG/DL (ref 0–149)
TSH SERPL-ACNC: 6.44 UIU/ML (ref 0.35–5.5)
TTG IGA SER IA-ACNC: <1.02 FLU (ref 0–4.99)
UIBC SERPL-MCNC: 323 UG/DL (ref 110–370)
VIT B12 SERPL-MCNC: 530 PG/ML (ref 211–911)

## 2024-09-25 LAB
GLIADIN IGG SER IA-ACNC: <0.56 FLU (ref 0–4.99)
TTG IGG SER IA-ACNC: <0.82 FLU (ref 0–4.99)

## 2024-09-26 LAB — ANDROSTANOLONE SERPL-MCNC: 87 PG/ML (ref 24–208)

## 2024-09-27 LAB
MTHFR C.1298A>C GENO BLD/T: NORMAL
MTHFR C.677C>T GENO BLD/T: NORMAL
MTHFR GENE MUT ANL BLD/T: NORMAL

## 2024-09-28 LAB
SHBG SERPL-SCNC: 43 NMOL/L (ref 25–122)
T3REVERSE SERPL-MCNC: 9.1 NG/DL (ref 9–27)
TESTOST FREE SERPL-MCNC: 4.7 PG/ML (ref 1.3–9.2)
TESTOST SERPL-MCNC: 33 NG/DL (ref 9–55)

## 2024-10-28 ENCOUNTER — APPOINTMENT (OUTPATIENT)
Dept: LAB | Facility: MEDICAL CENTER | Age: 34
End: 2024-10-28
Payer: COMMERCIAL

## 2025-03-28 ENCOUNTER — HOSPITAL ENCOUNTER (OUTPATIENT)
Dept: LAB | Facility: MEDICAL CENTER | Age: 35
End: 2025-03-28
Attending: OBSTETRICS & GYNECOLOGY
Payer: COMMERCIAL

## 2025-03-28 LAB
25(OH)D3 SERPL-MCNC: 38 NG/ML (ref 30–100)
CORTIS SERPL-MCNC: 10.4 UG/DL (ref 0–23)
CRP SERPL HS-MCNC: 0.7 MG/L (ref 0–3)
DHEA-S SERPL-MCNC: 88 UG/DL (ref 98.8–340)
EST. AVERAGE GLUCOSE BLD GHB EST-MCNC: 120 MG/DL
ESTRADIOL SERPL-MCNC: 74 PG/ML
FERRITIN SERPL-MCNC: 14.2 NG/ML (ref 10–291)
FOLATE SERPL-MCNC: 15.6 NG/ML
FSH SERPL-ACNC: 6.5 MIU/ML
GLUCOSE SERPL-MCNC: 90 MG/DL (ref 65–99)
HBA1C MFR BLD: 5.8 % (ref 4–5.6)
LH SERPL-ACNC: 6.8 IU/L
MAGNESIUM SERPL-MCNC: 2.1 MG/DL (ref 1.5–2.5)
PROGEST SERPL-MCNC: <0.1 NG/ML
T3 SERPL-MCNC: 73.5 NG/DL (ref 60–181)
T3FREE SERPL-MCNC: 2.78 PG/ML (ref 2–4.4)
T4 FREE SERPL-MCNC: 0.99 NG/DL (ref 0.93–1.7)
T4 SERPL-MCNC: 4.9 UG/DL (ref 4–12)
THYROPEROXIDASE AB SERPL-ACNC: 30.3 IU/ML (ref 0–9)
TSH SERPL-ACNC: 2.27 UIU/ML (ref 0.35–5.5)
VIT B12 SERPL-MCNC: 976 PG/ML (ref 211–911)

## 2025-03-28 PROCEDURE — 84480 ASSAY TRIIODOTHYRONINE (T3): CPT

## 2025-03-28 PROCEDURE — 83001 ASSAY OF GONADOTROPIN (FSH): CPT

## 2025-03-28 PROCEDURE — 86141 C-REACTIVE PROTEIN HS: CPT

## 2025-03-28 PROCEDURE — 83525 ASSAY OF INSULIN: CPT

## 2025-03-28 PROCEDURE — 82670 ASSAY OF TOTAL ESTRADIOL: CPT

## 2025-03-28 PROCEDURE — 83735 ASSAY OF MAGNESIUM: CPT

## 2025-03-28 PROCEDURE — 84402 ASSAY OF FREE TESTOSTERONE: CPT

## 2025-03-28 PROCEDURE — 83036 HEMOGLOBIN GLYCOSYLATED A1C: CPT

## 2025-03-28 PROCEDURE — 82947 ASSAY GLUCOSE BLOOD QUANT: CPT

## 2025-03-28 PROCEDURE — 36415 COLL VENOUS BLD VENIPUNCTURE: CPT

## 2025-03-28 PROCEDURE — 83002 ASSAY OF GONADOTROPIN (LH): CPT

## 2025-03-28 PROCEDURE — 84439 ASSAY OF FREE THYROXINE: CPT

## 2025-03-28 PROCEDURE — 86376 MICROSOMAL ANTIBODY EACH: CPT

## 2025-03-28 PROCEDURE — 82607 VITAMIN B-12: CPT

## 2025-03-28 PROCEDURE — 82746 ASSAY OF FOLIC ACID SERUM: CPT

## 2025-03-28 PROCEDURE — 84481 FREE ASSAY (FT-3): CPT

## 2025-03-28 PROCEDURE — 82306 VITAMIN D 25 HYDROXY: CPT

## 2025-03-28 PROCEDURE — 84144 ASSAY OF PROGESTERONE: CPT

## 2025-03-28 PROCEDURE — 84403 ASSAY OF TOTAL TESTOSTERONE: CPT

## 2025-03-28 PROCEDURE — 82627 DEHYDROEPIANDROSTERONE: CPT

## 2025-03-28 PROCEDURE — 82728 ASSAY OF FERRITIN: CPT

## 2025-03-28 PROCEDURE — 84270 ASSAY OF SEX HORMONE GLOBUL: CPT

## 2025-03-28 PROCEDURE — 82533 TOTAL CORTISOL: CPT

## 2025-03-28 PROCEDURE — 84443 ASSAY THYROID STIM HORMONE: CPT

## 2025-03-30 LAB — INSULIN P FAST SERPL-ACNC: 10 UIU/ML (ref 3–25)

## 2025-04-02 LAB
SHBG SERPL-SCNC: 40 NMOL/L (ref 25–122)
TESTOST FREE SERPL-MCNC: 2.5 PG/ML (ref 1.3–9.2)
TESTOST SERPL-MCNC: 17 NG/DL (ref 9–55)

## 2025-08-13 ENCOUNTER — HOSPITAL ENCOUNTER (OUTPATIENT)
Dept: LAB | Facility: MEDICAL CENTER | Age: 35
End: 2025-08-13
Attending: OBSTETRICS & GYNECOLOGY
Payer: COMMERCIAL

## 2025-08-13 LAB
25(OH)D3 SERPL-MCNC: 41 NG/ML (ref 30–100)
ALBUMIN SERPL BCP-MCNC: 4.3 G/DL (ref 3.2–4.9)
ALBUMIN/GLOB SERPL: 1.5 G/DL
ALP SERPL-CCNC: 56 U/L (ref 30–99)
ALT SERPL-CCNC: 10 U/L (ref 2–50)
ANION GAP SERPL CALC-SCNC: 10 MMOL/L (ref 7–16)
AST SERPL-CCNC: 16 U/L (ref 12–45)
BILIRUB SERPL-MCNC: 0.4 MG/DL (ref 0.1–1.5)
BUN SERPL-MCNC: 15 MG/DL (ref 8–22)
CALCIUM ALBUM COR SERPL-MCNC: 9 MG/DL (ref 8.5–10.5)
CALCIUM SERPL-MCNC: 9.2 MG/DL (ref 8.5–10.5)
CHLORIDE SERPL-SCNC: 106 MMOL/L (ref 96–112)
CO2 SERPL-SCNC: 22 MMOL/L (ref 20–33)
CORTIS SERPL-MCNC: 11.2 UG/DL (ref 0–23)
CREAT SERPL-MCNC: 0.69 MG/DL (ref 0.5–1.4)
DHEA-S SERPL-MCNC: 143 UG/DL (ref 98.8–340)
EST. AVERAGE GLUCOSE BLD GHB EST-MCNC: 111 MG/DL
ESTRADIOL SERPL-MCNC: 122 PG/ML
FERRITIN SERPL-MCNC: 22.2 NG/ML (ref 10–291)
FSH SERPL-ACNC: 6.3 MIU/ML
GFR SERPLBLD CREATININE-BSD FMLA CKD-EPI: 117 ML/MIN/1.73 M 2
GLOBULIN SER CALC-MCNC: 2.9 G/DL (ref 1.9–3.5)
GLUCOSE SERPL-MCNC: 89 MG/DL (ref 65–99)
HBA1C MFR BLD: 5.5 % (ref 4–5.6)
IRON SATN MFR SERPL: 24 % (ref 15–55)
IRON SERPL-MCNC: 77 UG/DL (ref 40–170)
LH SERPL-ACNC: 12.8 IU/L
POTASSIUM SERPL-SCNC: 4.1 MMOL/L (ref 3.6–5.5)
PROGEST SERPL-MCNC: 2.02 NG/ML
PROT SERPL-MCNC: 7.2 G/DL (ref 6–8.2)
SODIUM SERPL-SCNC: 138 MMOL/L (ref 135–145)
T3 SERPL-MCNC: 69 NG/DL (ref 60–181)
T4 FREE SERPL-MCNC: 0.66 NG/DL (ref 0.93–1.7)
T4 SERPL-MCNC: 3.5 UG/DL (ref 4–12)
TIBC SERPL-MCNC: 323 UG/DL (ref 250–450)
TSH SERPL-ACNC: 8.62 UIU/ML (ref 0.38–5.33)
UIBC SERPL-MCNC: 246 UG/DL (ref 110–370)

## 2025-08-13 PROCEDURE — 83550 IRON BINDING TEST: CPT

## 2025-08-13 PROCEDURE — 82533 TOTAL CORTISOL: CPT

## 2025-08-13 PROCEDURE — 82627 DEHYDROEPIANDROSTERONE: CPT

## 2025-08-13 PROCEDURE — 84480 ASSAY TRIIODOTHYRONINE (T3): CPT

## 2025-08-13 PROCEDURE — 83525 ASSAY OF INSULIN: CPT

## 2025-08-13 PROCEDURE — 84140 ASSAY OF PREGNENOLONE: CPT

## 2025-08-13 PROCEDURE — 82728 ASSAY OF FERRITIN: CPT

## 2025-08-13 PROCEDURE — 84270 ASSAY OF SEX HORMONE GLOBUL: CPT

## 2025-08-13 PROCEDURE — 82642 DIHYDROTESTOSTERONE: CPT

## 2025-08-13 PROCEDURE — 84439 ASSAY OF FREE THYROXINE: CPT

## 2025-08-13 PROCEDURE — 82306 VITAMIN D 25 HYDROXY: CPT

## 2025-08-13 PROCEDURE — 84144 ASSAY OF PROGESTERONE: CPT

## 2025-08-13 PROCEDURE — 80053 COMPREHEN METABOLIC PANEL: CPT

## 2025-08-13 PROCEDURE — 83002 ASSAY OF GONADOTROPIN (LH): CPT

## 2025-08-13 PROCEDURE — 84443 ASSAY THYROID STIM HORMONE: CPT

## 2025-08-13 PROCEDURE — 36415 COLL VENOUS BLD VENIPUNCTURE: CPT

## 2025-08-13 PROCEDURE — 83036 HEMOGLOBIN GLYCOSYLATED A1C: CPT

## 2025-08-13 PROCEDURE — 83001 ASSAY OF GONADOTROPIN (FSH): CPT

## 2025-08-13 PROCEDURE — 84403 ASSAY OF TOTAL TESTOSTERONE: CPT

## 2025-08-13 PROCEDURE — 84402 ASSAY OF FREE TESTOSTERONE: CPT

## 2025-08-13 PROCEDURE — 82670 ASSAY OF TOTAL ESTRADIOL: CPT

## 2025-08-13 PROCEDURE — 83540 ASSAY OF IRON: CPT

## 2025-08-15 LAB — INSULIN P FAST SERPL-ACNC: 9 UIU/ML (ref 3–25)

## 2025-08-16 LAB — ANDROSTANOLONE SERPL-MCNC: 126.9 PG/ML (ref 24–208)

## 2025-08-17 LAB — PREG SERPL-MCNC: 173 NG/DL (ref 15–132)

## 2025-08-20 LAB
SHBG SERPL-SCNC: 61 NMOL/L (ref 25–122)
TESTOST FREE SERPL-MCNC: 4.5 PG/ML (ref 1.3–9.2)
TESTOST SERPL-MCNC: 40 NG/DL (ref 9–55)